# Patient Record
(demographics unavailable — no encounter records)

---

## 2019-01-14 NOTE — REPVR
EXAM: 

 CT Abdomen and Pelvis With Contrast 



EXAM DATE/TIME: 

 1/14/2019 9:06 PM 



CLINICAL HISTORY: 

 38 years old, female; Pain; Abdominal pain; Generalized; Additional info: 

Intractable vomiting; Intermittent pain 



TECHNIQUE: 

 Axial computed tomography images of the abdomen and pelvis with intravenous 

contrast. 

 All CT scans at this facility use at least one of these dose optimization 

techniques: automated exposure control; mA and/or kV adjustment per patient 

size (includes targeted exams where dose is matched to clinical indication); or 

iterative reconstruction. 

 Coronal and sagittal reformatted images were created and reviewed. 



CONTRAST: 

 100 ml of ISOVUE 370 administered intravenously. 



COMPARISON: 

 No relevant prior studies available. 



FINDINGS: 

 Lower thorax:  Clear lung bases. 

ABDOMEN: 

 Liver:  Normal appearing liver. 

 Gallbladder and bile ducts:  Surgical clips at the gallbladder fossa. The 

patient is post cholecystectomy. 

 Pancreas:  Normal pancreas. 

 Spleen:  Normal spleen. 

 Adrenals:  Normal adrenal glands. 

 Kidneys and ureters:  Normal kidneys. 

 Stomach and bowel:  The cecum is in the right pelvis. Normal appearing small 

bowel. 

 Appendix:  The appendix appears within the range of normal. 



PELVIS: 

 Bladder:  Normal urinary bladder. 

 Reproductive:  There is an IUD within the endometrial cavity of the uterus. 

There is a 1.8 CM collapsing cyst left ovary. 2 cm dominant cyst right ovary. 



ABDOMEN and PELVIS: 

 Intraperitoneal space:  There is no evidence of pneumoperitoneum. There is a 

small amount of free fluid within the pelvis. There is no evidence of free 

fluid in the abdomen. 

 Bones/joints: No acute fracture. No dislocation. 

 Soft tissues: Unremarkable. 

 Vasculature: There is opacification of the SMV and opacification of the SMA. 

There is opacification of the aorta which appears intact and normal in size. 

 Lymph nodes:  There is no evidence of lymphadenopathy. 



IMPRESSION: 

1.8 CM enhancing collapsing cyst left ovary. Trace fluid within the pelvis. 



Electronically signed by: Deangelo Sweeney On 01/14/2019  21:57:06 PM

## 2019-01-14 NOTE — HPEPDOC
[facility] Medical History & Physical


Date of Admission


Jan 14, 2019


Primary Care Physician:  A


Other Provider


Dictating/admitting Emil Keating M.D.


Attending Physician:  DAMIEN BRYSON MD





History and Physical


CHIEF COMPLAINT: Nausea and vomiting 3 days





HISTORY OF PRESENT ILLNESS: Patient is 38-year-old female with unknown medical 

history. No PCP. Presents with nausea and vomiting for the past 3 days. She 

describes vomitus contents initially continue ingested food and then became wate

ry and more bilious now. She denies any blood. She denies any fever, no chills. 

She is sexually active with her , though with IUD for contraception. She 

was accompanied with her  and reported that this has been going on 

intermittently and this is not her first episode. At interview, patient gives a 

positive history of use of cannabinoids. Denies illicit drugs, indulges 

occasionally in alcohol, smoke cigarettes, half a pack a day since age 14. Since

her symptoms are unabating she decided to seek further medical attention. She 

denies any recent sick contacts. She denies any relation with recently ingested 

new foods. No change in her urinary habits. She denies any cough, shortness of 

breath, chest pain or palpitations. She denies any feelings of dizziness or 

headaches. 





PAST MEDICAL HISTORY:


None





PAST SURGICAL HISTORY:


None





SOCIAL HISTORY:


Lives with  in [place] has a son.


Indulges occasionally in alcohol, smoke cigarettes, half a pack a day since age 

14. Uses cannabinoids.





FAMILY HISTORY:


Father: Biological father unknown


Mother: Cancer in maternal relation





ALLERGIES: Please see below.





REVIEW OF SYSTEMS:


12 point review of systems negative other than that described in the body of HPI





HOME MEDICATIONS: Please see below. 





PHYSICAL EXAMINATION:


VITAL SIGNS: Temperature 98.5, pulse 63, respiratory rate 16, blood pressure 

145, pulse oximetry 100% on room air.


GENERAL APPEARANCE: Patient seen, lying calmly in bed, not in any apparent 

distress. She is not pale, anicteric and afebrile


HEENT: Atraumatic. Neck: Supple.


LUNGS: Clear to auscultation bilaterally.


CARDIOVASCULAR: S1 and 2 heard, no murmurs, rubs or gallops.


ABDOMEN: Obese, soft, not tender, not distended. Bowel sounds normoactive.


MUSCULOSKELETAL: Apparently within normal limits. 


EXTREMITIES: No pedal edema, 2+ bilateral pedal pulses noted.


NEUROLOGICAL: Awake, alert, oriented 3. 


PSYCHIATRIC: Normal affect 





LABORATORY DATA: See below.





IMAGING: 


None, will ask for CT abdomen without contrast





EKG: Normal sinus rhythm





MICROBIOLOGY: Please see below. 





ASSESSMENT: 38-year-old female with a known medical history. Comes in 

complaining of 3 days of nausea and vomiting. Exam is unremarkable, labs also 

unremarkable. EKG normal sinus rhythm. Patient gives a history of cannabinoids 

use, last use was yesterday.





DIAGNOSIS:


1. Gastritis, etiology unknown, likely related to cannabinoids use. Will rule 

out pregnancy.


.


PLAN:


1. I will admit patient to the medical floors under care of Dr. Bryson.


2. We'll continue normal saline 100 mils per hour.


3. Will ask for CT abdomen, GI panel, U tox screen and pregnancy test.


4. GI prophylaxis. Will hold pantoprazole for now until pregnancy test result is

obtained.


5. Patient will continue with IV Zofran 4 mg every 8 hours


6. DVT prophylaxis, TEDs.


7. Further treatment will be per patient's clinical course.





Vital Signs





Vital Signs








  Date Time  Temp Pulse Resp B/P (MAP) Pulse Ox O2 Delivery O2 Flow Rate FiO2


 


1/14/19 19:35 98.5 63 16 145/77 (99) 100 Room Air  











Laboratory Data


Labs 24H


Laboratory Tests 2


1/14/19 15:51: 


Immature Granulocyte % (Auto) 0.4, White Blood Count 9.2, Red Blood Count 4.48, 

Hemoglobin 14.9, Hematocrit 42.8, Mean Corpuscular Volume 95.5, Mean Corpuscular

Hemoglobin 33.3H, Mean Corpuscular Hemoglobin Concent 34.8, Red Cell 

Distribution Width 12.6, Platelet Count 291, Neutrophils (%) (Auto) 88.8H, 

Lymphocytes (%) (Auto) 9.0L, Monocytes (%) (Auto) 1.7, Eosinophils (%) (Auto) 

0.0, Basophils (%) (Auto) 0.1, Neutrophils # (Auto) 8.1H, Lymphocytes # (Auto) 

0.8L, Monocytes # (Auto) 0.2, Eosinophils # (Auto) 0.0, Basophils # (Auto) 0.0, 

Nucleated Red Blood Cells % (auto) 0.0, Anion Gap 14, Glomerular Filtration Rate

> 60.0, Calcium Level 8.8, Aspartate Amino Transf (AST/SGOT) 13, Alanine 

Aminotransferase (ALT/SGPT) 22, Alkaline Phosphatase 52, Total Bilirubin 0.5, 

Direct Bilirubin 0.1, Total Creatine Kinase 55, Creatine Kinase MB < 1.0, 

Creatine Kinase MB Relative Index 1.82, Troponin I < 0.02, Total Protein 7.0, 

Albumin 3.8, Albumin/Globulin Ratio 1.19, Lipase 188, Human Chorionic 

Gonadotropin, Qual NEGATIVE


1/14/19 15:52: 


Urine Color YELLOW, Urine Appearance HAZY, Urine pH 6.0, Urine Specific Gravity 

1.026, Urine Protein 1+H, Urine Glucose (UA) NEGATIVE, Urine Ketones 2+H, Urine 

Blood NEGATIVE, Urine Nitrite NEGATIVE, Urine Bilirubin NEGATIVE, Urine 

Urobilinogen 0.2, Urine Leukocyte Esterase NEGATIVE, Urine WBC (Auto) 1, Urine 

RBC (Auto) 8H, Urine Hyaline Casts (Auto) 0, Urine Bacteria (Auto) NEGATIVE, 

Urine Squamous Epithelial Cells 8, Urine Mucus (Auto) SMALL, Urine Sperm (Auto) 


1/14/19 20:30: 


Urine Amphetamines Screen NEGATIVE, Urine Benzodiazepines Screen POSITIVEH, 

Urine Opiates Screen NEGATIVE, Urine Methadone Screen NEGATIVE, Urine 

Barbiturates Screen NEGATIVE, Urine Phencyclidine Screen NEGATIVE, Urine Cocaine

Metabolite Screen NEGATIVE, Urine Cannabinoids Screen POSITIVEH


CBC/BMP


Laboratory Tests


1/14/19 15:51








Red Blood Count 4.48, Mean Corpuscular Volume 95.5, Mean Corpuscular Hemoglobin 

33.3 H, Mean Corpuscular Hemoglobin Concent 34.8, Red Cell Distribution Width 

12.6, Neutrophils (%) (Auto) 88.8 H, Lymphocytes (%) (Auto) 9.0 L, Monocytes (%)

(Auto) 1.7, Eosinophils (%) (Auto) 0.0, Basophils (%) (Auto) 0.1, Neutrophils # 

(Auto) 8.1 H, Lymphocytes # (Auto) 0.8 L, Monocytes # (Auto) 0.2, Eosinophils # 

(Auto) 0.0, Basophils # (Auto) 0.0





Home Medications


Scheduled


 (Hair/Skin/Nails 1250-7.5-7.5 Mcg-mg-Unt) 1 Chw Chw, 1 CHW PO DAILY





Scheduled PRN


Alprazolam (Alprazolam) 0.5 Mg Tab, 0.5 MG PO BID PRN for ANXIETY


Ondansetron HCl (Ondansetron HCl) 4 Mg Tab, 4 MG PO BID PRN for NAUSEA





Allergies


Coded Allergies:  


     No Known Allergies (Unverified , 1/14/19)











EMIL KEATING MD            Jan 14, 2019 22:43

## 2019-01-15 NOTE — IPN
DATE:  01/15/2019

 

SUBJECTIVE: The patient is seen and examined. Continued to have nausea and

vomiting with any oral intake. Denies any chest pain, pressure or discomfort.

Denies any abdominal pain. Had one spike of temperature.

 

VITAL SIGNS: Maximum temperature (T-max) 100.7, T-current 99.4, pulse 61,

respiratory rate 19, blood pressure 139/87, pulse oximetry 100% on room air.

 

LABORATORY DATA: WBC 9.4, hemoglobin and hematocrit 15/43.9, platelets 279.

 

Chemistry: Sodium 140, potassium 4, chloride 106, bicarbonate 24, BUN 6,

creatinine 0.6, cardiac enzymes negative time two. Pregnancy test negative. GC

and Chlamydia negative.

 

Urine toxicology positive for benzodiazepine and cannabinoid.

 

PHYSICAL EXAMINATION:

GENERAL: Patient alert, comfortable, in no acute distress.

HEENT: Normocephalic, atraumatic.

PULMONARY: Bilaterally clear.

CARDIAC: Regular, S1, S2.

ABDOMEN: Soft, nontender, positive bowel sounds.

EXTREMITIES: No clubbing, cyanosis, or edema.

 

ASSESSMENT AND PLAN: This is a 38-year-old female patient with no significant

past medical history but does report for the past 10 years the patient has

intermittent episodes of nausea and vomiting, presented with nausea and vomiting.

 

 

1. Nausea and vomiting. Possible gastritis versus cannabinoid-induced cyclic

vomiting syndrome. The patient has no history of diabetes. Pregnancy test has

been negative. Pelvic examination is done. Gonorrhea (GC) and Chlamydia has been

negative. Zofran, Phenergan, IV fluids have been ordered. We will consider

consulting gastroenterology or general surgery for possible

esophagogastroduodenoscopy (EGD) if the patient does not improve. Clear liquid

diet.

 

2. Vaginal candidiasis. One dose of Diflucan has been given.

 

3. Gastroesophageal reflux disease (GERD). Continue proton pump inhibitor (PPI).

 

 

4. Deep vein thrombosis (DVT) prophylaxis. Heparin subcutaneous.

 

DISPOSITION: Pending clinical improvement. Consider further workup if not

improved.

## 2019-01-16 NOTE — CR.PDOC
General


Date of Consultation:  Jan 16, 2019


Referring Provider:  DAMIEN BRYSON MD





Consultation


Primary physician/ hospitalist: Dr. Bryson


Reason for consult: Persistent vomiting.





HPI: 


38-year-old female patient with no chronic medical comorbidities, h/o 

cholecystectomy 9 years ago, admitted to hospital for persistent nausea and 

vomiting and unable to tolerate anything by mouth. GI was consulted for the 

same. Patient reports having cyclical episodes of nausea with vomiting, no 

precipitating event, episodes happening at least 2-3 times every year for the 

past 9 years, and recently worsening with at least 1-2 episodes every month. 

Patient reports the episode started with severe nausea and recurrent vomiting 

and inability to tolerate anything by mouth, which lasts for 3-7 days. Patient 

does report use of marijuana for the past 9 years but reports that the nausea 

started before that. (Patient is not clear about the amount of marijuana usage).

Patient also reports associated abdominal pain from recurrent vomiting.


Off note: Patient was noted to have episodes of fever during this admission, and

she reports having cough with white phlegm and history of dyspareunia. ( prior 

h/o IUD).





Pertinent negative GI symptoms:


Patient denies diarrhea, early satiety or unintentional weight loss. No history 

of hematemesis, melena or hematochezia. 


Patient reports regular bowel movements.





Review of Systems:


GI: as stated above 


CVS: No chest pain, No palpitations, No leg swelling.


RS: No Shortness of breath, No Wheezing, has cough with white phlegm.


CNS: No dizziness, No motor weakness, No sensory problems 


Hematology: No bruising, No gum bleeding, 


Musculoskeletal: No joint pain, ambulating well.


Skin: No rash 


: No hematuria, No burning sensation of the urine 


ENT:  No ear discharge/ pain, No dysphagia.


Eyes: No photophobia.





Home medications: reviewed. Antithrombotic agents -none


Medical h/o: As above.


Surgical h/o: None on abdomen. 


Social h/o: Alcohol-denies, tobacco-denies, IVDA/ drugs-denies IVDA, but uses 

marijuana everyday.. 


Family h/o of GI cancers -denies any cancer.


Prior Endoscopies: 


--- EGD -had many years ago, normal as per patient.


--- Colonoscopy -none





Prior GI evaluation: None in [facility]





Exam: 


Vitals: reviewed 


General: Alert and oriented x 3, not in distress


HEENT: NO pallor, no icterus. Normal oropharynx, NO cervical lymph nodes.


Chest: symmetric with bilateral clear air entry,


CVS: S1, S2 heard, normal, no murmurs .


Abdomen:  non-distended, no surgical scars, soft, non-tender, no palpable 

masses, normal bowel sounds heard.


Rectal exam: Patient refused .


Extremities: no pedal edema, pulses palpable.


CNS: no focal motor or sensory deficits. Moves all extremities


Skin: no rash.





Labs: reviewed.


Imaging tests: reviewed 





( Note: As per hospitalist -- patient had pelvic exam - noted whitish discharge 

but negative GC and No signs of PID).





Impression:


-- Recurrent cyclical episodes of vomiting with nausea and abdominal pain relat

ed to vomiting -- DDx-- Likely Marijuana related emesis syndrome ( patient 

reported vomiting getting better with hot showers), vs Idiopathic gastroparesis 

vs PUD. 


-- Fever of unknown origin -- s/p CT abdomen - no intraabdominal pathology and 

US abdomen 1.2 cm liver hemangioma, normal CBD , patient having cough and h/o 

Dyspareunia -- DDx-- unlikely GI source of sepsis r/o Bronchitis vs PID/UTI.





Recommendations:





- Patient educated about the test results, possible differential diagnoses and 

All questions answered.


- Septic work up as per primary team. (As per discussion with Hospitalist 

patient already being worked up for the above).


- IV hydration


- NPO or clear liquid diet as tolerated for now and keep NPO tomorrow for 

planned procedure.


- Give IV reglan 1-2 times daily.


- Continue IV Pantoprazole for now.


- Will schedule for EGD tomorrow to evaluate for PUD or gastric outlet 

obstruction.


- The procedure, indications, risks (bleeding, perforation, infection, 

hypotension, respiratory depression, allergy, need for endotracheal intubation, 

surgery, colostomy, cardiac arrest, even death), benefits, limitations (e.g., 

missing a lesion), and all other alternatives (including no intervention) were 

explained to the patient who understood and agreed for the procedure.





Plan of care discussed with patient and primary team. Patient verbalized 

understanding and agreed with the plan.





Allergies


Coded Allergies:  


     No Known Allergies (Unverified , 1/14/19)





Home Medications


Scheduled


 (Hair/Skin/Nails 1250-7.5-7.5 Mcg-mg-Unt) 1 Chw Chw, 1 CHW PO DAILY, (Reported)





Scheduled PRN


Alprazolam (Alprazolam) 0.5 Mg Tab, 0.5 MG PO BID PRN for ANXIETY, (Reported)


Ondansetron HCl (Ondansetron HCl) 4 Mg Tab, 4 MG PO BID PRN for NAUSEA, 

(Reported)











TAD CRUMP MD      Jan 16, 2019 18:46

## 2019-01-16 NOTE — REP
Right upper quadrant sonography:

 

History:  Right upper quadrant pain, fever, nausea and vomiting.

 

Comparison CT study September 14, 2019.

 

Findings:  Scanning through right upper quadrant of the abdomen is performed.

The gallbladder is surgically absent.  Common bile duct is normal measuring 0.6

cm in greatest diameter.  There is a 1.2 x 0.7 x 0.8 cm hemangioma near the

periportal region of the liver.  No other focal liver lesion is seen.  No

pancreatic abnormality is observed.  There is no evidence of ascites or right

renal abnormality.  The right kidney measures 12.1 x 5.1 x 5.4 cm.

 

Impression:

 

A 1.2 cm hemangioma in the liver.  Post cholecystectomy.  Otherwise negative.

 

 

Electronically Signed by

Carrington Clay MD 01/16/2019 07:39 P

## 2019-01-16 NOTE — REP
Chest two views

 

HISTORY: Cough

 

Comparison: None

 

The lungs are clear.  The heart is normal in size.  The pulmonary vasculature is

normal in appearance.  The bony structure is intact.

 

IMPRESSION:  No acute disease.

 

 

Electronically Signed by

Ricky Germain MD 01/16/2019 09:11 A

## 2019-01-16 NOTE — IPNPDOC
Text Note


Date of Service


The patient was seen on 1/16/19.





NOTE


Continue to reported n/v. fever reported by nursing stable. n/v improved with 

hot shower. cough productive of clear sputum





PHYSICAL EXAMINATION:


GENERAL: Patient alert, comfortable, in no acute distress.


HEENT: Normocephalic, atraumatic.


PULMONARY: Bilaterally clear.


CARDIAC: Regular, S1, S2.


ABDOMEN: Soft, nontender, positive bowel sounds.


EXTREMITIES: No clubbing, cyanosis, or edema.


 


ASSESSMENT AND PLAN: This is a 38-year-old female patient with no significant


past medical history but does report for the past 10 years the patient has


intermittent episodes of nausea and vomiting, presented with nausea and 

vomiting.


 


 


1. Nausea and vomiting. Possible gastritis versus cannabinoid-induced cyclic


vomiting syndrome. The patient has no history of diabetes. Pregnancy test has


been negative. Gonorrhea (GC) and Chlamydia has been


negative. reglan , IV fluids have been ordered. GI consulted for possible EGD. 

Clear liquid


diet.





2. fever of unknown origin. CRP neg, no WBC elevation. possible 2/2 to viral 

bronchitis. cxr neg, f/u cultures. will monitor,. respiratory panel neg. 


 


3. Vaginal candidiasis. One dose of Diflucan has been given.


 


4. Gastroesophageal reflux disease (GERD). Continue proton pump inhibitor (PPI).


 


 


5. Deep vein thrombosis (DVT) prophylaxis. Heparin subcutaneous.


 


DISPOSITION: Pending clinical improvement. GI consult





DESTINY,Britney, I+O


VS, Britney, I+O


Laboratory Tests


1/16/19 05:44








Red Blood Count 4.43, Mean Corpuscular Volume 93.7, Mean Corpuscular Hemoglobin 

32.3, Mean Corpuscular Hemoglobin Concent 34.5, Red Cell Distribution Width 12.3





1/16/19 14:09








Calcium Level 8.8








Vital Signs








  Date Time  Temp Pulse Resp B/P (MAP) Pulse Ox O2 Delivery O2 Flow Rate FiO2


 


1/16/19 14:00 100.0 61 18 150/81 (104) 100 Room Air  














I&O- Last 24 Hours up to 6 AM 


 


 1/16/19





 05:59


 


Intake Total 160 ml


 


Output Total 2300 ml


 


Balance -2140 ml

















DAMIEN BRYSON MD                      Jan 16, 2019 17:53

## 2019-01-17 NOTE — IPNPDOC
Text Note


Date of Service


The patient was seen on 1/17/19.





NOTE


Continue to reported n/v. fever reported by nursing. n/v improved with hot 

shower. cough productive of clear sputum





PHYSICAL EXAMINATION:


GENERAL: Patient alert, comfortable, in no acute distress.


HEENT: Normocephalic, atraumatic.


PULMONARY: Bilaterally clear.


CARDIAC: Regular, S1, S2.


ABDOMEN: Soft, nontender, positive bowel sounds.


EXTREMITIES: No clubbing, cyanosis, or edema.


 


ASSESSMENT AND PLAN: This is a 38-year-old female patient with 


past medical history of questionable porphyria but does report for the past 10 

years the patient has


intermittent episodes of nausea and vomiting, presented with nausea and 

vomiting.


 


 


1. Nausea and vomiting. Possible gastritis versus cannabinoid-induced cyclic


vomiting syndrome vs acute intermittent porphria. The patient has no history of 

diabetes a1c neg. Pregnancy test has


been negative. Gonorrhea (GC) and Chlamydia has been


negative. reglan , IV fluids have been ordered. GI consulted for possible EGD. 

full liquid, urine study for workup of acute intermittent porphyria. gastric 

emptying study








2. fever of unknown origin. CRP neg, no WBC elevation. possible 2/2 to viral 

bronchitis. cxr neg, f/u cultures. will monitor,. respiratory panel neg. ID 

consulted 


 


3. Bacterial vaginosis. Given flagy further rec as per ID.  


 


4. Gastroesophageal reflux disease (GERD). Continue proton pump inhibitor (PPI).


 


 


5. Deep vein thrombosis (DVT) prophylaxis. Heparin subcutaneous.


 


DISPOSITION: Pending clinical improvement. Workup for acute intermittent 

porphyria, gastric emptying study. ID consult.





VS,Britney, I+O


VS, Primoe, I+O


Laboratory Tests


1/17/19 06:09








Red Blood Count 4.80, Mean Corpuscular Volume 94.4, Mean Corpuscular Hemoglobin 

33.1 H, Mean Corpuscular Hemoglobin Concent 35.1, Red Cell Distribution Width 

12.3, Calcium Level 8.4 L








Vital Signs








  Date Time  Temp Pulse Resp B/P (MAP) Pulse Ox O2 Delivery O2 Flow Rate FiO2


 


1/17/19 14:00 99.4 70 21 124/78 (93) 97 Room Air  














I&O- Last 24 Hours up to 6 AM 


 


 1/17/19





 06:00


 


Intake Total 1680 ml


 


Output Total 1250 ml


 


Balance 430 ml

















DAMIEN BRYSON MD                      Jan 17, 2019 17:50

## 2019-01-17 NOTE — ROOR
________________________________________________________________________________

Patient Name: Marleen Hernandez               Procedure Date: 1/17/2019 10:36 AM

MRN: [id number]                          Account Number: [id number]

YOB: 1980                Age: 38

Room: Main OR                          Gender: Female

Note Status: Finalized                 

________________________________________________________________________________

 

Procedure:           Upper GI endoscopy

Indications:         Persistent vomiting of unknown cause

Providers:           Trevin Gabriel MD

Referring MD:        Madelyn Francisco Md

Requesting Provider: 

Medicines:           Monitored Anesthesia Care

Complications:       No immediate complications.

________________________________________________________________________________

Procedure:           Pre-Anesthesia Assessment:

                     - Prior to the procedure, a History and Physical was 

                     performed, and patient medications and allergies were 

                     reviewed. The patient is competent. The risks and 

                     benefits of the procedure and the sedation options and 

                     risks were discussed with the patient. All questions were 

                     answered and informed consent was obtained. Patient 

                     identification and proposed procedure were verified by 

                     the physician, the nurse and the anesthesiologist in the 

                     procedure room. Mental Status Examination: alert and 

                     oriented. Airway Examination: normal oropharyngeal airway 

                     and neck mobility. Respiratory Examination: clear to 

                     auscultation. CV Examination: normal. Prophylactic 

                     Antibiotics: The patient does not require prophylactic 

                     antibiotics. Prior Anticoagulants: The patient has taken 

                     no previous anticoagulant or antiplatelet agents. ASA 

                     Grade Assessment: II - A patient with mild systemic 

                     disease. After reviewing the risks and benefits, the 

                     patient was deemed in satisfactory condition to undergo 

                     the procedure. The anesthesia plan was to use monitored 

                     anesthesia care (MAC). Immediately prior to 

                     administration of medications, the patient was 

                     re-assessed for adequacy to receive sedatives. The heart 

                     rate, respiratory rate, oxygen saturations, blood 

                     pressure, adequacy of pulmonary ventilation, and response 

                     to care were monitored throughout the procedure. The 

                     physical status of the patient was re-assessed after the 

                     procedure.

                     The Endoscope was introduced through the mouth, and 

                     advanced to the second part of duodenum. The upper GI 

                     endoscopy was accomplished without difficulty. The 

                     patient tolerated the procedure well.

                                                                                

Findings:

     The examined esophagus was normal.

     The Z-line was regular and was found 37 cm from the incisors.

     Patchy mild inflammation characterized by erythema and granularity was 

     found in the gastric body and in the gastric antrum. Biopsies were taken 

     with a cold forceps for Helicobacter pylori testing. Verification of 

     patient identification for the specimen was done by the physician and 

     nurse using the patient's name, birth date and medical record number. 

     Estimated blood loss was minimal.

     The duodenal bulb and second portion of the duodenum were normal. 

     Biopsies for histology were taken with a cold forceps for evaluation of 

     celiac disease.

                                                                                

Impression:          - Normal esophagus.

                     - Z-line regular, 37 cm from the incisors.

                     - Gastritis. Biopsied.

                     - Normal duodenal bulb and second portion of the 

                     duodenum. Biopsied.

Recommendation:      - Patient has a contact number available for emergencies. 

                     The signs and symptoms of potential delayed complications 

                     were discussed with the patient. Return to normal 

                     activities tomorrow. Written discharge instructions were 

                     provided to the patient.

                     - Advance diet as tolerated.

                     - Continue present medications.

                     - Await pathology results.

                     - If Biopsy shows H. pylori will need therapy with 

                     antibiotic course..

                     - Return to primary care physician.

                                                                                

 

Trevin Gabriel MD

_______________________

Trevin Gabriel MD

1/17/2019 12:32:18 PM

This report has been signed electronically.

Number of Addenda: 0

 

Note Initiated On: 1/17/2019 10:36 AM

Estimated Blood Loss:

     Estimated blood loss was minimal.

## 2019-01-17 NOTE — CR
DATE OF CONSULTATION: 2019

 

REQUESTING PROVIDER: Dr. Madelyn Francisco

 

REASON FOR CONSULTATION:  Fever of unknown origin.

 

Ms. Hernandez is a very pleasant 38-year-old [place] female who has been having

persistent episodes of nausea and vomiting for least 10 years, since the removal

of her gallbladder.  She also experiences back pain and chills as well as joint

pains when these episodes occur.  She states that she was hospitalized right

after her gallbladder surgery for about 2 weeks due to intractable nausea and

vomiting and then had another attack approximately a year later.  As time has

gone on, these attacks have gotten more frequent to the point where they will

happen once or twice a month.  They last for about 3-5 days, where she will have

chills, vomiting, extreme nausea, and severe anxiety about whether or not she

will throw up.  This started to affect her activities of daily living.  She

states she can no longer go out to restaurants with her , as she is 
always

anxious about when an attack will occur.

 

During this present illness, she had been coughing for approximately a week, as

her 2-year-old son has been coughing as well.  This current attack of nausea and

vomiting began 3 days ago, and her generalized achiness got so bad that she told

her  she could not breathe.  In response, he called an ambulance, and she

was brought to [facility].

 

She states that she has had multiple emergency room (ER) visits over the last 
few

years, where she will go in complaining of nausea and vomiting, get medications

and intravenous (IV)  fluids, and then be sent home.  This is the first time she

has been hospitalized for this.

 

She says that she was diagnosed with porphyria years ago.  Is unsure of which

type she has and does not know how she was diagnosed.  She also says that she 
was

diagnosed with H. pylori in the past and was given medication for this, which 
she

never took.  She does admit to smoking marijuana, however, says that she started

smoking marijuana in order to stimulate her appetite after the cycles of nausea

and vomiting began, when she started developing anxiety about eating.  She also

does smoke cigarettes and will binge drink on the weekends with her  and

their family.  Her medical care has been somewhat disjointed, as she travels 
back

and forth the [place], where she has family, while her  is away on

training missions.

 

She denies any skin lesions, blistering, rashes, joint swelling, vision changes,

or psychiatric symptoms besides severe anxiety about eating.

 

REVIEW OF SYSTEMS:

CONSTITUTIONAL:  Positive for fevers, chills and inability to gain weight

secondary to nausea and vomiting, decreased appetite.

HEENT:  Denies vision changes, headaches, double vision, epistaxis, runny nose,

sinus pain, tinnitus or odynophagia.

CARDIOVASCULAR:  Denies palpitations, chest pain, orthopnea, or edema.

RESPIRATORY:  Positive for cough.  Negative for sputum production, hemoptysis,

wheezing, shortness of breath.

GASTROINTESTINAL:  Positive for nausea and vomiting as well as anorexia and

hemorrhoids.  Due to her hemorrhoid, she does admit to bright red blood per

rectum on the toilet paper when she wipes, but she denies hematochezia, melena,

tenesmus, or obstipation..  She denies hematemesis, diarrhea, constipation,

bloating, or difficulty swallowing.  She denies abdominal pain but states that

her abdomen is very sensitive and uncomfortable.

GENITOURINARY:  Denies vaginal discharge, dysuria, hematuria, nocturia, 
polyuria,

incontinence.  She does have the Paragard IUD as contraception.

MUSCULOSKELETAL:  Positive for joint achiness but negative for joint swelling,

decreased range of motion, or stiffness.  She also admits to generalized 
achiness

in her muscles as well.

INTEGUMENTARY:  Denies pruritus, rashes, dry lesions, wounds, incisions, or

eczema and

NEUROLOGIC:  Denies any changes to sight, smell, hearing, taste, seizures,

headaches, paresthesias, or numbness.

PSYCHIATRIC:  Positive for anxiety.  Denies paranoia, anhedonia, episodes of

ian, or changes in personality.

ENDOCRINE:  Denies mood swings, tremors, diarrhea, sweaty episodes, 
constipation,

or dry skin.  She also denies polydipsia or polyuria.

HEMATOLOGIC: Positive for using easy bruising but denies purpura, petechiae, or

easy bleeding.

LYMPHATIC:  Denies any new lumps or bumps anywhere.

 

PAST MEDICAL HISTORY:

1.  H. pylori.

2.  Porphyria, type unknown.

 

PAST SURGICAL HISTORY: In  she had her gallbladder removed secondary to

gallstones.

 

FAMILY HISTORY:  Father's side is unknown.  In her mother's side of the family,

she had an aunt who  from pancreatic cancer at age 57.  Her on uncle has had

colon cancer twice, and her mother had skin cancer, type unknown.

 

SOCIAL HISTORY:  She lives with her  and 2-year-old son here in 
[place].

As stated in history of present illness (HPI), she does binge drink alcohol on

the weekends and smokes marijuana.  She also smokes cigarettes, about half pack 
a

day since the age of 14.  She travels frequently and originally is from 
[place].

She immigrated at age 12.  Her  and she travel back and forth the

[place] quite frequently, and she has also recently been to the [facility]

[place] and [place] within the last 5 years.

 

ALLERGIES:  None.

 

CURRENT MEDICATIONS:

- metronidazole 500 mg intravenous (IV) every 12 hour

- Reglan 5 mg every 12 hours IV

- Xanax 0.5 mg twice a day as needed by mouth for anxiety

- heparin 5000 units every 12 hours subcutaneously

- Protonix 40 mg IV every 24 hours

- Tylenol 650 mg every 4 hours as needed by mouth for mild pain or fever.

 

PHYSICAL EXAMINATION:

VITAL SIGNS: Temperature 99.4, pulse 70, respiratory rate 21, blood pressure

124/78, pulse oximetry 97% on room air.

GENERAL:  A very pleasant [place] female, sitting upright in bed in no

apparent distress.

HEENT:  Atraumatic, normocephalic.  Mucous membranes are moist.  There are no

ulcers inside the mouth.  The patient has her own teeth, and dentition is fair.

Posterior pharynx is free of exudate or erythema.

NECK:  No lymphadenopathy is appreciated.  Neck is supple.  There are no masses.

LUNGS:  Clear to auscultation bilaterally.  No wheezes, rhonchi, or rales.

HEART:  Regular rate and rhythm.  No murmurs, gallops, or rubs.

ABDOMEN:  Normoactive bowel sounds.  Stretch marks from previous pregnancy are

noted.  She has mild discomfort to palpation of both upper quadrants, which is

less with palpation of the bilateral lower quadrants.  There is no suprapubic

tenderness.

BACK:  No costovertebral angle (CVA) tenderness bilaterally.

EXTREMITIES:  No clubbing, cyanosis, or edema.  There is no joint swelling.

SKIN:  The patient has some irritation from tape from her previous IV site,

otherwise no bruises or rashes are noted.  Skin integrity is intact.

PSYCHIATRIC:  The patient has a normal affect but quickly can become tearful and

anxious.

NEUROLOGIC:  The patient is alert and oriented times four.  She has no 
diminished

sensation bilaterally.  Muscle strength is 5/5 in all four extremities.  There

are no focal neurological deficits.  Cranial nerves II-XII are grossly intact.

 

LABORATORY DATA:

CBC:  WBC 8.3, hemoglobin 15.9, hematocrit 45.3, platelets 300.

 

Chemistry:  Sodium 136, potassium 3.7, chloride 102, carbon dioxide 25, BUN 6,,

creatinine 0.59, fasting glucose 104, hemoglobin A1c 5.4, calcium 8.4, magnesium

2.2.

 

Toxicology from 2019 was positive for benzodiazepines and cannabinoids.

 

Urine from 2019 was positive for 1+ protein and 2+ glucose as well as 8

urine RBC.

 

Serology:  Negative for chlamydia, gonorrhea, and HIV

 

Microbiology:  Blood cultures times two were negative.  Culture from the

endocervix showed Gardnerella vaginalis.

 

Respiratory panel negative.

 

Sputum culture pending; however, the Gram stain showed gram-positive cocci in

pairs, chains, and clusters as well as a few gram-positive rods.

 

IMAGING STUDIES:

Abdomen and pelvis CT from 2019 showed a 1.8 cm enhancing collapsing cyst

on left ovary with trace fluid within the pelvis.

 

Chest x-ray from 2019 showed no acute disease.

 

Abdominal ultrasound from 2019 showed a 1.2 cm hemangioma in the liver 
post

cholecystectomy, otherwise negative.

 

ASSESSMENT AND PLAN:  This is a 38-year-old [facility] female who has 

been having periodic fevers, chills, nausea, and back pain.  She was taken to 
the 

operating room (OR) this morning by Dr. Gabriel for upper endoscopy.  
Pathology 

from biopsies is pending right now; however, he did find gastritis in the 
gastric body

and antrum.  We should rule out periodic fever syndrome.  With the patient 
having 

an alleged history of porphyria diagnosis, we have ordered a 24-hour urine 
porphyrins 

and will workup for porphyria.  I have also ordered a sedimentation rate for the
morning, 

as the patient's C-reactive protein (CRP) was normal.  Incidentally, she is not 
having 

any vaginal symptoms, so her bacterial vaginosis does not need to be treated.  
This 

was discussed with the primary care team, and Flagyl will be discontinued, as 
this 

can contribute to nausea as well.

 

Thank you for involving us in the care of Ms. Hernandez.  We will continue to follow

her.

 

My faculty preceptor for this patient encounter was physically present during 
the

encounter and was fully available.  All aspects of the patient interview,

examination, medical decision making process, and medical care plan development

were reviewed and approved by the faculty preceptor. The faculty preceptor is

aware and concurs with the plan as stated in the body of this note and will

attest to such by his/her co-signature.

COLLETTE

## 2019-01-18 NOTE — REP
GASTRIC EMPTYING STUDY:  01/18/2019

 

CLINICAL HISTORY: Nausea, vomiting.  Long history abdominal pain and tenderness

with early satiety, bloating, reflux and heartburn.  Had upper endoscopy

yesterday.

 

COMPARISON:  CT abdomen pelvis 01/14/2019.

 

TECHNIQUE:  The patient received 1.05 mCi technetium 99m sulfur colloid in two

scrambled eggs with pulses of water.  The technologist notes indicate the patient

had severe headache after eating and vomited most everything she had ingested

just as soon as she set up at the completion of her examination.

 

FINDINGS:  Sequential 2-minute images in anterior posterior projections for 90

minutes showed filling of the stomach and no definite peristalsis into the small

intestine.  I do not see visible reflux on these images.

 

With region of interest drawn about the stomach, gastric emptying was calculated

by a semi automated method.

 

The gastric emptying at 90 minutes is 0%.

 

The normal t-1/2 for gastric emptying is 90 minutes.

 

IMPRESSION:

 

1.   Profound gastroparesis or gastric outlet obstruction.  No emptying of the

stomach contents over 90 minutes of observation after ingesting two scrambled

eggs and 12 ounces of water. No reflux observed during this period of imaging.

She vomited her gastric contents after the exam was completed when she sat up.

 

 

Electronically Signed by

Matthew Tian MD 01/18/2019 01:17 P
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
## 2019-01-18 NOTE — IPNPDOC
Text Note


Date of Service


The patient was seen on 1/18/19.





NOTE


Continue to reported n/v. n/v improved with hot shower. Reported anxiety and 

agitation, abd pain





PHYSICAL EXAMINATION:


GENERAL: Patient alert, comfortable, in no acute distress.


HEENT: Normocephalic, atraumatic.


PULMONARY: Bilaterally clear.


CARDIAC: Regular, S1, S2.


ABDOMEN: Soft, minimum tender, no rebound no guarding, positive bowel sounds.


EXTREMITIES: No clubbing, cyanosis, or edema.


 


ASSESSMENT AND PLAN: This is a 38-year-old female patient with 


past medical history of questionable porphyria but does report for the past 10 

years the patient has


intermittent episodes of nausea and vomiting, presented with nausea and 

vomiting.


 


 


1. Nausea and vomiting. Possible gastritis versus cannabinoid-induced cyclic


vomiting syndrome vs acute intermittent porphria. The patient has no history of 

diabetes a1c neg. Pregnancy test has


been negative. Gonorrhea (GC) and Chlamydia has been


negative. IV fluids have been ordered. GI consulted. EGD appreciated. H Pylori 

neg.  full liquid, urine study for workup of acute intermittent porphyria. 

gastric emptying study showed sever gastroparesis, reglan improved. d/w Dr Gabriel 








2. fever of unknown origin. CRP neg, no WBC elevation. possible 2/2 to viral 

bronchitis. vs acute intermittent prophyria  cxr neg, f/u cultures. will 

monitor,. respiratory panel neg. ID consulted. workup does not show infection


 


3. Possible Acute intermittent porphyria


Urine studies ordered. 


consulted Hematology 


Rec, high glucose loading, Hemin. 


further rec as per Hematology 


 


4. Gastroesophageal reflux disease (GERD). Continue proton pump inhibitor (PPI).


 


 


5. Deep vein thrombosis (DVT) prophylaxis. Heparin subcutaneous.


 


DISPOSITION: Pending clinical improvement. Workup for acute intermittent 

porphyria, Hemin ordered.





VS,Fishbone, I+O


VS, Fishbone, I+O


Laboratory Tests


1/18/19 06:24








Red Blood Count 4.63, Mean Corpuscular Volume 92.4, Mean Corpuscular Hemoglobin 

32.6, Mean Corpuscular Hemoglobin Concent 35.3, Red Cell Distribution Width 

12.2, Calcium Level 8.5








Vital Signs








  Date Time  Temp Pulse Resp B/P (MAP) Pulse Ox O2 Delivery O2 Flow Rate FiO2


 


1/18/19 14:00 98.1 72 18 160/101 (120) 94   


 


1/18/19 06:00      Room Air  














I&O- Last 24 Hours up to 6 AM 


 


 1/18/19





 06:00


 


Intake Total 1680 ml


 


Output Total 750 ml


 


Balance 930 ml

















DAMIEN BRYSON MD                      Jan 18, 2019 19:48

## 2019-01-19 NOTE — IPNPDOC
Text Note


Date of Service


The patient was seen on 1/19/19.





NOTE


Continue to reported n/v. n/v improved with hot shower. Reported anxiety and 

agitation, abd pain





PHYSICAL EXAMINATION:


GENERAL: Patient alert, comfortable, in no acute distress.


HEENT: Normocephalic, atraumatic.


PULMONARY: Bilaterally clear.


CARDIAC: Regular, S1, S2.


ABDOMEN: Soft, minimum tender, no rebound no guarding, positive bowel sounds.


EXTREMITIES: No clubbing, cyanosis, or edema.


 


ASSESSMENT AND PLAN: This is a 38-year-old female patient with 


past medical history of questionable porphyria but does report for the past 10 

years the patient has


intermittent episodes of nausea and vomiting, presented with nausea and 

vomiting.


 


 


1. Nausea and vomiting. Possible gastritis versus cannabinoid-induced cyclic


vomiting syndrome vs acute intermittent porphria. The patient has no history of 

diabetes a1c neg. Pregnancy test has


been negative. Gonorrhea (GC) and Chlamydia has been


negative. IV fluids have been ordered. GI consulted. EGD appreciated. H Pylori 

neg.  full liquid, urine study for workup of acute intermittent porphyria. 

gastric emptying study showed sever gastroparesis, reglan improved. d/w Dr Gabriel 








2. fever of unknown origin. CRP neg, no WBC elevation. possible 2/2 to viral 

bronchitis. vs acute intermittent prophyria  cxr neg, f/u cultures. will 

monitor,. respiratory panel neg. ID consulted. workup does not show infection


 


3. Possible Acute intermittent porphyria


Urine studies ordered. 


consulted Hematology 


Rec, high glucose loading fluid, Hemin. 


further rec as per Hematology 


 


4. Gastroesophageal reflux disease (GERD). Continue proton pump inhibitor (PPI).


 


 


5. Deep vein thrombosis (DVT) prophylaxis. Heparin subcutaneous.


 


DISPOSITION: Pending clinical improvement. Workup for acute intermittent 

porphyria, Hemin ordered.





VS,Fishbone, I+O


VS, Fishbone, I+O


Laboratory Tests


1/19/19 06:31








Red Blood Count 4.23, Mean Corpuscular Volume 92.4, Mean Corpuscular Hemoglobin 

32.6, Mean Corpuscular Hemoglobin Concent 35.3, Red Cell Distribution Width 

12.2, Calcium Level 8.1 L








Vital Signs








  Date Time  Temp Pulse Resp B/P (MAP) Pulse Ox O2 Delivery O2 Flow Rate FiO2


 


1/19/19 14:00 99.2 84 19 122/79 (93) 99 Room Air  














I&O- Last 24 Hours up to 6 AM 


 


 1/19/19





 06:00


 


Intake Total 2560 ml


 


Output Total 1500 ml


 


Balance 1060 ml

















DAMIEN BRYSON MD                      Jan 19, 2019 19:36

## 2019-01-19 NOTE — CR
DATE OF CONSULTATION: 01/18/2019

 

This is a very pleasant 38-year-old female who was admitted to the hospital due

concern for persistent nausea and vomiting.  The patient had been taking

marijuana for persistent nausea.  Her pregnancy test had been negative and she

has had no known history of any problems with eating any contaminated food and no

one else in the family is sick.  She has had a history of gastroesophageal reflux

disease and is on a proton pump inhibitor.  She also has a pertinent past medical

history of having porphyria and has been treated by a Dr. Vaughan out of the

[place] area.  She has mainly been treated with intravenous glucose, but has

also received Hemin, at least one time when she was unable to break a porphyria

crisis.  She is complaining of restlessness, problems sleeping, persistent nausea

and agitation.  She has intermittent low back pain as well, but no history of any

hematuria.  She is currently on her menses and has just started this.  She has

been having increasing pain with her menstrual cycles.  These cyclical episodes

of nausea and vomiting have been somewhat associated with her menstrual cycle in

the past as well.

 

Her current medications at this point include:

-  Ativan 0.5 mg p.o. b.i.d.

-  ondansetron 4 mg p.o. b.i.d. p.r.n. for nausea

 

PAST SURGICAL HISTORY:  None.

 

GYNECOLOGIC HISTORY:  She currently has a copper T placed for contraception.  She

has one son.  She currently lives in [place] with her  who is on active

duty .  She denies any alcohol or smoking cigarettes.  She currently is

using marijuana for cyclical pain.

 

FAMILY HISTORY:  She relates that her mother is healthy, but there are family

members on her mother's side who have had cancer, unknown.

 

ALLERGIES:  She no known drug allergies.

 

MEDICATIONS HERE (Had included):

-  Flagyl

-  fluoxetine 10 mg p.o. daily

-  as well as the above noted Ativan

 

REVIEW OF SYSTEMS:  She relates a headache, restlessness, inability to sleep,

agitation.  She has had no current visual problems, problems swallowing or

decreased appetite.   She has a feeling of fullness in the epigastric area.

Persistent ongoing nausea, unremitting, that has not been helped too much with

the marijuana use.  The marijuana was used to specifically for intractable

nausea.  She had no current diarrhea or vaginal discharge.  She is currently

menstruating.  She has not had any misplacement or any history of any pelvic

inflammatory disease or any infection.  She has a copper T that had gone out of

place and  replaced by her GYN.  She currently has no current joint pains.

Current back pain and the back pain is mild to moderate and intermittent.

Neurological:  She shows restlessness.  She has had no hallucinations.  No

current seizure disorder.

 

PHYSICAL EXAMINATION:

Vital Signs:  Her weight is 57.5 kg.  Her temperature is 98.1, pulse 72,

respiratory rate is 18, blood pressure (BP) is 160/101 and pulse oximetry is 94.

 

Her HEENT is normocephalic, atraumatic.  PERRL.  EOMI.  Sclerae is otherwise

white, nonicteric.  Oropharynx is otherwise clear.

Her neck is supple with no adenopathy.

Chest is clear to auscultation and percussion.

Abdomen:  She has some fullness in the epigastric area.  No ascites.  No

guarding.  No rebound.

Extremities:  Show no cyanosis, clubbing or any edema.

 

On her urine examination, the color is yellow, appearance is hazy, 1+

proteinuria, 2+ ketones.  She has 8 RBCs and a small amount of mucus.

 

On her drug screen, she is negative for opiates, methadone, barbiturates;

positive for benzodiazepines which were given in the hospital; negative for

cocaine, and is positive for cannabinoids which was anticipated.

 

On her chemistries, her sodium is 135, potassium 3.7, chloride is 103, CO2 is 25,

BUN is 6, creatinine 0.59, GFR is greater than 60, fasting glucose is 101,

hemoglobin A1c is 5.4, calcium is 8.5, magnesium is 2.2.

 

Stool for Helicobacter pylori is currently pending.

 

Serologies:  She is negative for chlamydia, HIV and gonorrhea.

 

IMPRESSION:  Acute intermittent porphyria by history with current neurological

symptoms of agitation, restlessness, sleeplessness, low back pain - all

consistent with AIP, but can be compounded by marijuana use as well.

 

PLAN:  To change her intravenous fluids to D5 normal saline.  Monitor for any

signs of hyponatremia.  Monitor for any neurological symptoms.  Also, patient's

with acute intermittent porphyria can have exacerbation due to their menstrual

cycles, which appears to be the case with her since her symptoms have been

cyclical.  I am recommending Hematin at 3 mg/kg/day to try for 3 days.  This has

been ordered through the pharmacy and will be coming to us within 24 to 48 hours.

The laboratory testing   warfarin is not required at this time, the patient on

clinical basis and history will need to be empirically treated.

## 2019-01-20 NOTE — IPNPDOC
Text Note


Date of Service


The patient was seen on 1/20/19.





NOTE


reported hungry. no n/v this am.  denied abd pain, chest pain. not fever 

overnight. 





PHYSICAL EXAMINATION:


GENERAL: Patient alert, comfortable, in no acute distress.


HEENT: Normocephalic, atraumatic.


PULMONARY: Bilaterally clear.


CARDIAC: Regular, S1, S2.


ABDOMEN: Soft, minimum tender, no rebound no guarding, positive bowel sounds.


EXTREMITIES: No clubbing, cyanosis, or edema.


 


ASSESSMENT AND PLAN: This is a 38-year-old female patient with 


past medical history of questionable porphyria but does report for the past 10 

years the patient has


intermittent episodes of nausea and vomiting, presented with nausea and 

vomiting.


 


 


1. Nausea and vomiting. Possible gastritis versus cannabinoid-induced cyclic


vomiting syndrome vs acute intermittent porphria. The patient has no history of 

diabetes a1c neg. Pregnancy test has


been negative. Gonorrhea (GC) and Chlamydia has been


negative. IV fluids have been ordered. GI consulted. EGD appreciated. H Pylori 

neg. advance diet to low residual.  urine study for workup of acute intermittent

porphyria. gastric emptying study showed sever gastroparesis, reglan improved. 

d/w Dr Gabriel 








2. fever of unknown origin. CRP neg, no WBC elevation. possible 2/2 to viral 

bronchitis. vs acute intermittent prophyria  cxr neg, f/u cultures. will 

monitor,. respiratory panel neg. ID consulted. workup does not show infection


 


3. Possible Acute intermittent porphyria


Urine studies ordered. 


consulted Hematology 


Rec, high glucose loading fluid, Hemin. 


further rec as per Hematology 


 


4. Gastroesophageal reflux disease (GERD). Continue proton pump inhibitor (PPI).


 


 


5. Deep vein thrombosis (DVT) prophylaxis. Heparin subcutaneous.


 


DISPOSITION: Pending clinical improvement. Workup for acute intermittent 

porphyria, Hemin ordered.





VS,Fishbone, I+O


VS, Fishbone, I+O


Laboratory Tests


1/20/19 05:41








Red Blood Count 4.14, Mean Corpuscular Volume 98.8 H, Mean Corpuscular 

Hemoglobin 33.3 H, Mean Corpuscular Hemoglobin Concent 33.7, Red Cell 

Distribution Width 12.5, Calcium Level 8.7








Vital Signs








  Date Time  Temp Pulse Resp B/P (MAP) Pulse Ox O2 Delivery O2 Flow Rate FiO2


 


1/20/19 06:00 97.7 72 20 110/70 83 96 Room Air  














I&O- Last 24 Hours up to 6 AM 


 


 1/20/19





 06:00


 


Intake Total 1520 ml


 


Output Total 0 ml


 


Balance 1520 ml

















DAMIEN BRYSON MD                      Jan 20, 2019 14:07

## 2019-01-21 NOTE — IPNPDOC
Date Seen


The patient was seen on 1/21/19.





Progress Note


SUBJECTIVE: Patient is a   38  year old    who has been    slowly improving wiht

 her GI  symtpms   since  dextrose Iv  has been administered 


She hs not had any episodes of emesis   or abdominal pain this am 











PHYSICAL EXAMINATION:


VITAL SIGNS: Please see below. 


GENERAL: [  NC  at perrl eomi sclera  white   


or  clear  


neck supple 


chest  clear ot A&P  


 CV  s1  s2 appreciated  no murmurs  


Abdomen  is   soft  non tender 


Ext   no cce   


skin no rashes  or lesions  noted 


 neuro is intact    


no numbness or   tinging of the fingertips or toes  noted 





LABORATORY DATA, IMAGING STUDIES, MICROBIOLOGY: Please see below.





Echocardiogram: .





DVT prophylaxis ordered?: 





ASSESSMENT AND PLAN: 


1. : [Acute intermittent porphyria  attack  precipitated   by menses  ].





2. : .





3. : .





DISPOSITION: [I advised the patient  to  continue at home 


try to reduce stress


have a  low  carbohydrate , high protein   diet   for now 


follow up  in the outpatient   department   in one  week 


We will check her  porphyrin  labs     in the cancer center 





Allergies


Coded Allergies


  No Known Allergies (Unverified1/14/19)





Current Medications


Acetaminophen (Tylenol Tab) 650 mg Q4HP  PRN PO MILD PAIN OR FEVER Last 

administered on 1/20/19at 14:11;  Start 1/14/19 at 22:15;  Stop 1/21/19 at 

13:17;  Status DC


Alprazolam (Xanax) 0.5 mg BID  PRN PO ANXIETY Last administered on 1/20/19at 

22:54;  Start 1/16/19 at 13:30;  Stop 1/21/19 at 13:17;  Status DC


Dextrose/Sodium Chloride 1,000 ml @  125 mls/hr Q8H IV  Last administered on 

1/19/19at 00:30;  Start 1/18/19 at 16:30;  Stop 1/19/19 at 07:30;  Status DC


Fluoxetine HCl (PROzac) 10 mg DAILY PO  Last administered on 1/21/19at 09:30;  

Start 1/18/19 at 09:00;  Stop 1/21/19 at 13:17;  Status DC


Heparin Sodium (Porcine) (Heparin) 5,000 units Q12H SQ  Last administered on 

1/20/19at 21:05;  Start 1/15/19 at 09:00;  Stop 1/21/19 at 13:17;  Status DC


Home Med (Med Rec Complete!)  ASDIRECTED XX ;  Start 1/14/19 at 21:30;  Stop 

1/14/19 at 21:30;  Status DC


Lactated Ringer's 1,000 ml @  75 mls/hr [id number] IV ;  Start 1/17/19 at 12:45;  

Stop 1/17/19 at 13:45;  Status DC


Metoclopramide HCl (REGLAN INJection) 5 mg Q12H IV  Last administered on 

1/18/19at 06:06;  Start 1/17/19 at 19:00;  Stop 1/18/19 at 16:56;  Status DC


Metoclopramide HCl (REGLAN INJection) 5 mg Q8H IV  Last administered on 

1/17/19at 06:39;  Start 1/16/19 at 15:00;  Stop 1/17/19 at 07:16;  Status DC


Metoclopramide HCl (REGLAN INJection) 5 mg Q8H IV  Last administered on 

1/21/19at 06:26;  Start 1/18/19 at 23:00;  Stop 1/21/19 at 13:17;  Status DC


Metronidazole 500 mg/IV Miscellaneous Supplies 100 ml @  100 mls/hr Q12H IV ;  

Start 1/18/19 at 02:00;  Stop 1/18/19 at 02:00;  Status DC


Metronidazole 500 mg/IV Miscellaneous Supplies 100 ml @  100 mls/hr Q8H IV  Last

 administered on 1/17/19at 13:43;  Start 1/17/19 at 14:00;  Stop 1/17/19 at 

14:15;  Status DC


Ondansetron HCl (ZOFRAN INJection) 4 mg Q4HP  PRN IV NAUSEA OR VOMITING;  Start 

1/17/19 at 12:45;  Stop 1/17/19 at 13:45;  Status DC


Ondansetron HCl (ZOFRAN INJection) 4 mg Q4HP  PRN IV NAUSEA OR VOMITING Last 

administered on 1/18/19at 15:25;  Start 1/18/19 at 08:15;  Stop 1/21/19 at 

13:17;  Status DC


Ondansetron HCl (ZOFRAN INJection) 4 mg Q8HP  PRN IV NAUSEA OR VOMITING Last 

administered on 1/16/19at 12:07;  Start 1/14/19 at 22:15;  Stop 1/16/19 at 

14:39;  Status DC


Oxycodone/ Acetaminophen (Percocet 5mg/ 325mg Tablet) 1 tab ASDIRECTED  PRN PO 

MILD/MODERATE PAIN (PS 1-7);  Start 1/17/19 at 12:45;  Stop 1/17/19 at 13:45;  

Status DC


Pantoprazole Sodium (Protonix) 40 mg Q24H IV  Last administered on 1/20/19at 

08:48;  Start 1/15/19 at 09:00;  Stop 1/21/19 at 13:17;  Status DC


Pantoprazole Sodium (Protonix) 40 mg Q24H IV ;  Start 1/15/19 at 09:00;  Status 

Cancel


Potassium Chloride/Dextrose/ Sod Cl 1,000 ml @  100 mls/hr Q10H IV  Last 

administered on 1/21/19at 04:35;  Start 1/20/19 at 08:00;  Stop 1/21/19 at 

08:36;  Status DC


Potassium Chloride/Sodium Chloride 1,000 ml @  100 mls/hr Q10H IV  Last 

administered on 1/18/19at 12:17;  Start 1/16/19 at 07:11;  Stop 1/18/19 at 

16:23;  Status DC


Potassium Chloride 1,000 ml @  125 mls/hr Q8H IV  Last administered on 1/20/19at

 01:11;  Start 1/19/19 at 08:00;  Stop 1/20/19 at 07:55;  Status DC


Promethazine HCl (PHENERGAN INJection) 12.5 mg Q6HP  PRN IV NAUSEA Last 

administered on 1/16/19at 05:08;  Start 1/15/19 at 09:15;  Stop 1/16/19 at 

15:00;  Status DC


Sodium Chloride 1,000 ml @  100 mls/hr Q10H IV  Last administered on 1/16/19at 

05:08;  Start 1/14/19 at 22:15;  Stop 1/16/19 at 07:12;  Status DC


Laboratory Tests


1/18/19 06:24








Red Blood Count 4.63, Mean Corpuscular Volume 92.4, Mean Corpuscular Hemoglobin 

32.6, Mean Corpuscular Hemoglobin Concent 35.3, Red Cell Distribution Width 12

.2, Calcium Level 8.5





1/19/19 06:31








Red Blood Count 4.23, Mean Corpuscular Volume 92.4, Mean Corpuscular Hemoglobin 

32.6, Mean Corpuscular Hemoglobin Concent 35.3, Red Cell Distribution Width 

12.2, Calcium Level 8.1





1/20/19 05:41








Red Blood Count 4.14, Mean Corpuscular Volume 98.8, Mean Corpuscular Hemoglobin 

33.3, Mean Corpuscular Hemoglobin Concent 33.7, Red Cell Distribution Width 

12.5, Calcium Level 8.7





1/21/19 05:36








Red Blood Count 3.78, Mean Corpuscular Volume 97.1, Mean Corpuscular Hemoglobin 

33.1, Mean Corpuscular Hemoglobin Concent 34.1, Red Cell Distribution Width 

12.5, Calcium Level 8.3





Laboratory Tests


1/20/19 05:41








Red Blood Count 4.14, Mean Corpuscular Volume 98.8 H, Mean Corpuscular 

Hemoglobin 33.3 H, Mean Corpuscular Hemoglobin Concent 33.7, Red Cell 

Distribution Width 12.5, Calcium Level 8.7





1/21/19 05:36








Red Blood Count 3.78 L, Mean Corpuscular Volume 97.1 H, Mean Corpuscular 

Hemoglobin 33.1 H, Mean Corpuscular Hemoglobin Concent 34.1, Red Cell Dist

ribution Width 12.5, Calcium Level 8.3 L





]. all labs and meds reveiwed  





PFHX  


PSH 


PMHX  have remained  unchanged  since her admission





VS, I&O, 24H, Critical access hospitale


Vital Signs/I&O





Vital Signs








  Date Time  Temp Pulse Resp B/P (MAP) Pulse Ox O2 Delivery O2 Flow Rate FiO2


 


1/21/19 06:00 98.5 72 18 117/66 (83) 97 Room Air  














I&O- Last 24 Hours up to 6 AM 


 


 1/21/19





 06:00


 


Intake Total 2280 ml


 


Output Total 0 ml


 


Balance 2280 ml











Laboratory Data


24H LABS


Laboratory Tests 2


1/21/19 05:36: 


Nucleated Red Blood Cells % (auto) 0.0, Anion Gap 7L, Glomerular Filtration Rate

> 60.0, Blood Urea Nitrogen 9#, Creatinine 0.62, Sodium Level 138, Potassium 

Level 4.4, Chloride Level 103, Carbon Dioxide Level 28, Calcium Level 8.3L, 

Magnesium Level 2.0


CBC/BMP


Laboratory Tests


1/21/19 05:36








Red Blood Count 3.78 L, Mean Corpuscular Volume 97.1 H, Mean Corpuscular 

Hemoglobin 33.1 H, Mean Corpuscular Hemoglobin Concent 34.1, Red Cell 

Distribution Width 12.5, Calcium Level 8.3 L


Microbiology





Microbiology


1/15/19 Blood Culture - Final, Complete


          NO GROWTH AFTER 5 DAYS


1/15/19 Blood Culture - Final, Complete


          NO GROWTH AFTER 5 DAYS


1/15/19 Genital Culture - Final, Complete


          Gardnerella Vaginalis


1/16/19 Gram Stain - Final, Complete


          


1/16/19 Sputum Culture - Final, Complete


          


1/16/19 Respiratory Virus Panel (PCR) ([facility]) - Final, Complete











Tamiko Andrade MD                Jan 21, 2019 16:27

## 2019-01-21 NOTE — DSES
DATE OF ADMISSION:  01/17/2019

DATE OF DISCHARGE: 01/21/2019

 

PRIMARY CARE PROVIDER:  Elvia Jacobs

 

GASTROENTEROLOGIST:  Dr. Gabriel

 

INFECTIOUS DISEASE:  Dr. Marylene Duah

 

HEMATOLOGY/ONCOLOGY:  Dr. Tamiko Andrade

 

FINAL DIAGNOSES:

Nausea, vomiting, likely secondary to acute intermittent porphyria, gastritis,

cyclic vomiting syndrome, gastroparesis.

Fever, likely secondary to acute intermittent porphyria.

Gastroesophageal reflux disease (GERD).

 

This is a 38-year-old female patient with questionable underlying medical history

of porphyria, spouse of , presented with episode of nausea, vomiting for

the past 3 days. Not able to tolerate liquid or solid. Bilious vomiting, dry

heaving. Denies any blood. Initially denied any fever or chills. Patient sexually

active with , has an intrauterine device (IUD). Patient reported having

similar episode in the past 10 years with intermittent episodes. Does use

cannabinoids. Denies any other illicit drugs. Indulges alcohol occasionally.

Smokes half a pack per day since age 14. Patient had workup in the past but does

not know what is causing the nausea, vomiting. She is scheduled to do an

esophagogastroduodenoscopy (EGD) in [place]. Denies any cough, shortness of

breath, sick contact.  Recent travels, stays between [place] and [place].

Feeling weak.

 

HOSPITAL COURSE: The patient was admitted to the hospital. CT scan was done.

Pelvic exam was done. Patient's hospital course was complicated with episodes of

fever. Infectious disease was consulted. It was determined fever is not due to

infectious etiology. EGD was done. Gastric emptying study shows severe

gastroparesis. It was eventually determined that the patient's symptoms are

likely consistent with acute intermittent porphyria. The patient's fluid was

switched to high glucose fluids as per recommendation by hematology/oncology and

pharmacy has attempted to order hemin, which is a treatment for acute

intermittent porphyria. Urine studies for acute intermittent porphyria was sent.

Hemin is scheduled to be arriving at the hospital on 01/21/2019. The patient's

symptoms gradually improved over the weekend. The patient currently is tolerating

diet. Denies any chest pain, pressure, or discomfort. Almost returning to

baseline. As per hematology/oncology, Dr. Andrade, patient likely will have further

exacerbation in the near future. Pharmacy is actively trying to get application

to have hemin in house so the next time the patient had exacerbation, the patient

can receive the appropriate medication and patient will require high glucose

fluids next time patient arrives with similar symptoms. Currently the patient is

comfortable, tolerating oral, in no acute distress, ready to be discharged for

further care.

 

VITAL SIGNS: Temperature 98.5, pulse 72, respirations 18, blood pressure 117/66,

pulse oximetry 97% on room air.

 

LABORATORY: WBC 7.2, hemoglobin and hematocrit 12.5 over 36.7, platelets 259.

Chemistry: Sodium 138, potassium 4.4, chloride 103, bicarbonate 28, BUN 9,

creatinine 0.62.

 

GENERAL: Patient alert, comfortable, in no acute distress.

HEENT: Normocephalic, atraumatic.

PULMONARY: Bilaterally clear.

CARDIAC: Regular, S1, S2.

ABDOMEN: Soft, nontender. Positive bowel sounds.

EXTREMITIES: No clubbing, cyanosis, or edema.

 

DISCHARGE MEDICATION:

- fluoxetine 10 mg by mouth daily

- Reglan 5 mg by mouth three times a day

- Xanax 0.5 mg by mouth twice a day as needed

- Zofran 4 mg by mouth twice a day as needed

 

DISCHARGE INSTRUCTIONS: Please see primary care provider in 7 days. Referral from

primary care provider for hematologist, Dr. Andrade, and gastroenterology. Further

urine studies pending for acute intermittent porphyria. Please followup results

with primary care provider or Dr. Andrade. Return to the hospital if symptoms

worsen. Oral hydration.